# Patient Record
Sex: MALE | Race: WHITE | NOT HISPANIC OR LATINO
[De-identification: names, ages, dates, MRNs, and addresses within clinical notes are randomized per-mention and may not be internally consistent; named-entity substitution may affect disease eponyms.]

---

## 2020-07-17 PROBLEM — Z00.00 ENCOUNTER FOR PREVENTIVE HEALTH EXAMINATION: Status: ACTIVE | Noted: 2020-07-17

## 2020-07-21 ENCOUNTER — APPOINTMENT (OUTPATIENT)
Dept: SURGERY | Facility: CLINIC | Age: 80
End: 2020-07-21
Payer: MEDICARE

## 2020-07-21 VITALS
WEIGHT: 187 LBS | DIASTOLIC BLOOD PRESSURE: 84 MMHG | HEART RATE: 97 BPM | BODY MASS INDEX: 26.18 KG/M2 | SYSTOLIC BLOOD PRESSURE: 165 MMHG | HEIGHT: 71 IN | TEMPERATURE: 98.4 F

## 2020-07-21 DIAGNOSIS — Z87.09 PERSONAL HISTORY OF OTHER DISEASES OF THE RESPIRATORY SYSTEM: ICD-10-CM

## 2020-07-21 DIAGNOSIS — Z86.79 PERSONAL HISTORY OF OTHER DISEASES OF THE CIRCULATORY SYSTEM: ICD-10-CM

## 2020-07-21 PROCEDURE — 99204 OFFICE O/P NEW MOD 45 MIN: CPT

## 2020-07-21 RX ORDER — FLUTICASONE PROPION/SALMETEROL 500-50 MCG
BLISTER, WITH INHALATION DEVICE INHALATION
Refills: 0 | Status: ACTIVE | COMMUNITY

## 2020-07-21 RX ORDER — AMLODIPINE BESYLATE 10 MG/1
10 TABLET ORAL
Refills: 0 | Status: ACTIVE | COMMUNITY

## 2020-07-21 NOTE — CONSULT LETTER
[Dear  ___] : Dear  [unfilled], [Consult Letter:] : I had the pleasure of evaluating your patient, [unfilled]. [Please see my note below.] : Please see my note below. [FreeTextEntry1] : Hi Don- large hernias. I'll attempt to repair once cardiology has seen him. Thanks-Ugo

## 2020-07-21 NOTE — PLAN
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  headache, lower back pain, photophobia, phonophobia.  T max 102.6 s/p solumedrol x 1 dose and Ceftriaxone 2g x 1 dose   Lumbar puncture was done showed elevated protein and many cells with rbcs PCR positive for herpes.   HIV negative.     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  headache, lower back pain, photophobia, phonophobia.  T max 102.6 s/p solumedrol x 1 dose and Ceftriaxone 2g x 1 dose   Lumbar puncture was done showed elevated protein and many cells with rbcs PCR positive for herpes.   HIV negative.   back pain improved    Herpes meningitis  continue Acyclovir 10 mg/ kg/q 8 hours [FreeTextEntry1] : He will undergo a cardiac workup for low blood pressure first. I will order a CT scan to assess the content of the large hernia sacs. Will schedule for robotically assisted laparoscopic bilateral inguina, hernia repairs with mesh, umbilical hernia repair and possible open repairs once medically cleared.

## 2020-07-21 NOTE — ASSESSMENT
[FreeTextEntry1] : bilateral inguinal hernias with large amount of contents within the scrotum. \par surgical repair recommended. discussed a robotic approach and the The risks benefits and alternatives were discussed and the patient agrees to the described plan. I di discuss likelihood of postop hydrocele causing persistence of scrotal swelling and its ramifications. His expectations of the repair have been managed. \par

## 2020-07-21 NOTE — HISTORY OF PRESENT ILLNESS
[de-identified] : The patient presents with longstanding large bilateral scrotal hernias that he has had for over 15 yrs. He has not had them repaired in the past due to fear of surgery and minimal symptoms. However he is starting to feel they are heavier and causing more discomfort. He is active, owns a fish store. He is undergoing a cardiology workup for labile blood pressure currently. He has had no previous surgery in the past. He denies GI symptoms. He recently saw Dr Bromberg for urinary retention but is now on Flomax with relief.

## 2020-07-21 NOTE — PHYSICAL EXAM
[Normal Breath Sounds] : Normal breath sounds [Normal Heart Sounds] : normal heart sounds [Abdominal Masses] : No abdominal masses [Abdomen Tenderness] : ~T ~M No abdominal tenderness [No Rash or Lesion] : No rash or lesion [Alert] : alert [Oriented to Person] : oriented to person [Oriented to Place] : oriented to place [Oriented to Time] : oriented to time [Calm] : calm [de-identified] : NAD [de-identified] : large bilateral scrotal enlarged inguinal. hernias, small umbilical hernia,

## 2020-08-04 ENCOUNTER — APPOINTMENT (OUTPATIENT)
Dept: THORACIC SURGERY | Facility: HOSPITAL | Age: 80
End: 2020-08-04

## 2020-10-27 ENCOUNTER — APPOINTMENT (OUTPATIENT)
Dept: SURGERY | Facility: CLINIC | Age: 80
End: 2020-10-27
Payer: MEDICARE

## 2020-10-27 VITALS
BODY MASS INDEX: 26.88 KG/M2 | TEMPERATURE: 98.2 F | HEIGHT: 71 IN | SYSTOLIC BLOOD PRESSURE: 131 MMHG | DIASTOLIC BLOOD PRESSURE: 72 MMHG | HEART RATE: 86 BPM | WEIGHT: 192 LBS

## 2020-10-27 PROCEDURE — 99213 OFFICE O/P EST LOW 20 MIN: CPT

## 2020-10-27 PROCEDURE — 99072 ADDL SUPL MATRL&STAF TM PHE: CPT

## 2020-10-27 NOTE — ASSESSMENT
[FreeTextEntry1] : discussed plan- robotic assisted laparoscopic bilateral inguinal hernia repairs with mesh, open umbilical hernia repair\par The risks benefits and alternatives were discussed and the patient agrees to the described plan.\par

## 2020-10-27 NOTE — PHYSICAL EXAM
[Normal Breath Sounds] : Normal breath sounds [Normal Heart Sounds] : normal heart sounds [Alert] : alert [Oriented to Person] : oriented to person [Oriented to Place] : oriented to place [Oriented to Time] : oriented to time [Calm] : calm [de-identified] : NAD [de-identified] : large scrotal hernias bilaterally, stable, umbilical hernia

## 2020-10-27 NOTE — HISTORY OF PRESENT ILLNESS
[de-identified] : The patient presents for further discussion regarding large scrotal bilateral inguinal hernias. He had CT which verified small bowel contents within the hernia sacs. He had a recent PM placed. \par

## 2020-10-27 NOTE — PLAN
[FreeTextEntry1] : He will see Dr Marroquin (cardiology) for clearance and undergo PST/Covid testing at [facility]. He is looking towards Monday November November 30th.

## 2021-02-16 ENCOUNTER — APPOINTMENT (OUTPATIENT)
Dept: SURGERY | Facility: CLINIC | Age: 81
End: 2021-02-16
Payer: MEDICARE

## 2021-02-16 VITALS
WEIGHT: 197 LBS | TEMPERATURE: 97.2 F | HEART RATE: 88 BPM | HEIGHT: 71 IN | BODY MASS INDEX: 27.58 KG/M2 | DIASTOLIC BLOOD PRESSURE: 74 MMHG | SYSTOLIC BLOOD PRESSURE: 157 MMHG

## 2021-02-16 DIAGNOSIS — K40.90 UNILATERAL INGUINAL HERNIA, W/OUT OBSTRUCTION OR GANGRENE, NOT SPECIFIED AS RECURRENT: ICD-10-CM

## 2021-02-16 DIAGNOSIS — K42.9 UMBILICAL HERNIA W/OUT OBSTRUCTION OR GANGRENE: ICD-10-CM

## 2021-02-16 PROCEDURE — 99072 ADDL SUPL MATRL&STAF TM PHE: CPT

## 2021-02-16 PROCEDURE — 99213 OFFICE O/P EST LOW 20 MIN: CPT

## 2021-02-16 NOTE — PHYSICAL EXAM
[Abdominal Masses] : No abdominal masses [de-identified] : NAD [de-identified] : large scrotal hernia, (L>R), bilateral inguinal hernias

## 2021-02-16 NOTE — ASSESSMENT
[FreeTextEntry1] : robotic repair of bilateral inguinal hernias still recommended but only after completion of cardiac workup. Discussed with pt. He will agree to the stress test .

## 2021-02-16 NOTE — HISTORY OF PRESENT ILLNESS
[de-identified] : the patient returns to further discuss options of surgery. His symptoms are slightly worse- more discomfort. He has seen Dr Marroquin for cardiac workup and a stress test has been recommended. He has been rel;uctant to have the test and  and comes in today to discuss further. He was tentatively schedule for 2/22/21 but this may need to be postponed if he does not complete the cardiac workup.  [de-identified] : Previous A/P from 7/21/2020\par Assessment\par bilateral inguinal hernias with large amount of contents within the scrotum. \par surgical repair recommended. discussed a robotic approach and the The risks benefits and alternatives were discussed and the patient agrees to the described plan. I did discuss likelihood of postop hydrocele causing persistence of scrotal swelling and its ramifications. His expectations of the repair have been managed. \par  \par Plan\par He will undergo a cardiac workup for low blood pressure first. I will order a CT scan to assess the content of the large hernia sacs. Will schedule for robotically assisted laparoscopic bilateral inguina, hernia repairs with mesh, umbilical hernia repair and possible open repairs once medically cleared. \par \par

## 2021-02-22 ENCOUNTER — APPOINTMENT (OUTPATIENT)
Dept: SURGERY | Facility: HOSPITAL | Age: 81
End: 2021-02-22

## 2022-03-31 DIAGNOSIS — Z78.9 OTHER SPECIFIED HEALTH STATUS: ICD-10-CM

## 2022-03-31 DIAGNOSIS — M77.41 METATARSALGIA, RIGHT FOOT: ICD-10-CM

## 2022-03-31 DIAGNOSIS — Z86.79 PERSONAL HISTORY OF OTHER DISEASES OF THE CIRCULATORY SYSTEM: ICD-10-CM

## 2022-03-31 DIAGNOSIS — Z87.891 PERSONAL HISTORY OF NICOTINE DEPENDENCE: ICD-10-CM

## 2022-03-31 DIAGNOSIS — Z14.8 GENETIC CARRIER OF OTHER DISEASE: ICD-10-CM

## 2022-03-31 RX ORDER — ALBUTEROL SULFATE 90 UG/1
108 (90 BASE) AEROSOL, METERED RESPIRATORY (INHALATION)
Refills: 0 | Status: ACTIVE | COMMUNITY

## 2022-03-31 RX ORDER — LOSARTAN POTASSIUM 100 MG/1
100 TABLET, FILM COATED ORAL
Refills: 0 | Status: ACTIVE | COMMUNITY

## 2022-04-06 ENCOUNTER — NON-APPOINTMENT (OUTPATIENT)
Age: 82
End: 2022-04-06

## 2022-04-07 ENCOUNTER — APPOINTMENT (OUTPATIENT)
Dept: HEMATOLOGY ONCOLOGY | Facility: CLINIC | Age: 82
End: 2022-04-07

## 2022-12-26 ENCOUNTER — TRANSCRIPTION ENCOUNTER (OUTPATIENT)
Age: 82
End: 2022-12-26

## 2023-04-11 ENCOUNTER — APPOINTMENT (OUTPATIENT)
Dept: INTERNAL MEDICINE | Facility: CLINIC | Age: 83
End: 2023-04-11
Payer: MEDICARE

## 2023-04-11 PROCEDURE — 36415 COLL VENOUS BLD VENIPUNCTURE: CPT

## 2023-04-11 PROCEDURE — 99214 OFFICE O/P EST MOD 30 MIN: CPT | Mod: 25

## 2023-12-10 ENCOUNTER — RESULT REVIEW (OUTPATIENT)
Age: 83
End: 2023-12-10

## 2023-12-13 ENCOUNTER — RESULT REVIEW (OUTPATIENT)
Age: 83
End: 2023-12-13

## 2023-12-19 ENCOUNTER — TRANSCRIPTION ENCOUNTER (OUTPATIENT)
Age: 83
End: 2023-12-19

## 2024-01-23 ENCOUNTER — APPOINTMENT (OUTPATIENT)
Dept: PULMONOLOGY | Facility: CLINIC | Age: 84
End: 2024-01-23

## 2024-02-13 ENCOUNTER — APPOINTMENT (OUTPATIENT)
Dept: PULMONOLOGY | Facility: CLINIC | Age: 84
End: 2024-02-13

## 2024-02-26 ENCOUNTER — RESULT REVIEW (OUTPATIENT)
Age: 84
End: 2024-02-26

## 2024-02-27 ENCOUNTER — APPOINTMENT (OUTPATIENT)
Dept: PULMONOLOGY | Facility: CLINIC | Age: 84
End: 2024-02-27
Payer: MEDICARE

## 2024-02-27 VITALS
SYSTOLIC BLOOD PRESSURE: 159 MMHG | DIASTOLIC BLOOD PRESSURE: 76 MMHG | WEIGHT: 170 LBS | HEART RATE: 71 BPM | TEMPERATURE: 97.9 F | RESPIRATION RATE: 16 BRPM | OXYGEN SATURATION: 98 % | BODY MASS INDEX: 23.8 KG/M2 | HEIGHT: 71 IN

## 2024-02-27 PROCEDURE — 99214 OFFICE O/P EST MOD 30 MIN: CPT

## 2024-02-27 NOTE — ASSESSMENT
[FreeTextEntry1] : Bacterial pneumonia  I reviewed the chest x-ray.  There is resolution of the right upper lobe infiltrate.  I reviewed the images and compared to the previous 1.  The patient is not on antibiotic and he recovered from the pneumonia.  COPD  His both emphysema and chronic bronchitis.  I will follow-up on the CT scan of the chest to rule out underlying bronchiectasis as contributing to the chronic cough.  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  The 6-minute walk test was normal.  The patient is currently on Advair.  I informed the patient and the wife that Advair is not the first-line recommendation for COPD by the new guidelines but also the recommended not to change the medication if the patient is stable.  Patient is to DC the infant nebulized inhaled steroids as already on inhaled steroids and to continue on albuterol as needed basis.  Will follow-up on yearly PFT.  Patient is to continue increase activity as tolerated.  Patient has good quality of life.  Snoring

## 2024-02-27 NOTE — REVIEW OF SYSTEMS
[Cough] : cough [Hemoptysis] : no hemoptysis [Chest Tightness] : no chest tightness [Frequent URIs] : no frequent URIs [Sputum] : sputum [Dyspnea] : no dyspnea [Pleuritic Pain] : no pleuritic pain [Wheezing] : no wheezing [A.M. Dry Mouth] : no a.m. dry mouth [SOB on Exertion] : no sob on exertion [Negative] : Endocrine

## 2024-02-27 NOTE — HISTORY OF PRESENT ILLNESS
[Former] : former [Never] : never [Awakes with Dry Mouth] : awakes with dry mouth [Snoring] : snoring [TextBox_4] : Is doing better since the left the rehab.  He is walking around the house.  He is able to dress himself and shower without any assistance.  He go grocery shopping and he can carry objects and walk to the car with no exertion.  Only time is short of breath if he go up the stairs carrying groceries.  He has still have this productive cough the phlegm is between white and yellow.  This started him on budenoscide in the rehab. [Awakes Unrefreshed] : does not awaken unrefreshed [Awakes with Headache] : does not awaken with headache [Daytime Somnolence] : denies daytime somnolence [Difficulty Initiating Sleep] : does not have difficulty initiating sleep [Difficulty Maintaining Sleep] : does not have difficulty maintaining sleep [Fatigue] : no fatigue [Frequent Nocturnal Awakening] : denies frequent nocturnal awakening [Tired while Driving] : not tired while driving [Unintentional Sleep while Active] : no unintentional sleep while active [Unintentional Sleep while Inactive] : no unintentional sleep while inactive [Vivid dreams] : no vivid dreams [Witnessed Apneas] : no witnessed apneas [ESS] : 0

## 2024-03-06 ENCOUNTER — RESULT REVIEW (OUTPATIENT)
Age: 84
End: 2024-03-06

## 2024-03-18 ENCOUNTER — RESULT REVIEW (OUTPATIENT)
Age: 84
End: 2024-03-18

## 2024-03-19 ENCOUNTER — APPOINTMENT (OUTPATIENT)
Dept: PULMONOLOGY | Facility: CLINIC | Age: 84
End: 2024-03-19
Payer: MEDICARE

## 2024-03-19 VITALS
HEIGHT: 71 IN | BODY MASS INDEX: 23.52 KG/M2 | WEIGHT: 168 LBS | SYSTOLIC BLOOD PRESSURE: 153 MMHG | RESPIRATION RATE: 16 BRPM | OXYGEN SATURATION: 98 % | TEMPERATURE: 97.3 F | HEART RATE: 69 BPM | DIASTOLIC BLOOD PRESSURE: 78 MMHG

## 2024-03-19 DIAGNOSIS — J43.2 CENTRILOBULAR EMPHYSEMA: ICD-10-CM

## 2024-03-19 PROCEDURE — 99214 OFFICE O/P EST MOD 30 MIN: CPT

## 2024-03-19 NOTE — END OF VISIT
[FreeTextEntry3] : Pt seen with KARINA Ryder and agree on the above plan of care.  [>50% of the face to face encounter time was spent on counseling and/or coordination of care for ___] : Greater than 50% of the face to face encounter time was spent on counseling and/or coordination of care for [unfilled] [Time Spent: ___ minutes] : I have spent [unfilled] minutes of time on the encounter.

## 2024-03-19 NOTE — ASSESSMENT
[FreeTextEntry1] : Bacterial pneumonia  I reviewed the images of the CT scan with the pt and wife.  CT scan Patchy airspace opacities in the posterior right lower lobe compatible with either atelectasis or pneumonia. Recommend follow-up chest CT in 3 months time to assess for resolution.  No discrete pulmonary nodule or mass. No bronchiectasis.  There is resolution of the right upper lobe infiltrate.  I reviewed the images and compared to the previous 1.  The patient is not on antibiotic and he recovered from the pneumonia.  Discussed cannot R/O cancer recommend follow with repeat CT scan.   Follow up with CT scan in 3 months.   COPD  His both emphysema and chronic bronchitis.  I will follow-up on the CT scan of the chest to rule out underlying bronchiectasis as contributing to the chronic cough.  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  The 6-minute walk test was normal.  The patient is currently on Advair.  I informed the patient and the wife that Advair is not the first-line recommendation for COPD by the new guidelines but also the recommended not to change the medication if the patient is stable.  Will follow-up on yearly PFT.  Patient is to continue increase activity as tolerated.  Patient has good quality of life.  Snoring  I discussed the short and long term health effect of the obstructive seep apnea with the patient. These effects include, but not limited to, uncontrolled hypertension, CAD, arrhythmias, sudden death, CVA, and pulmonary hypertension. I advised the patient to avoid sedatives, narcotics, driving, and sleeping pills in the meantime. I discussed the therapeutic options including but not limited to CPAP, surgery, and oral appliance. Further recommendations will follow after sleep study.

## 2024-03-19 NOTE — REVIEW OF SYSTEMS
[Cough] : cough [Sputum] : sputum [Negative] : Psychiatric [Hemoptysis] : no hemoptysis [Chest Tightness] : no chest tightness [Frequent URIs] : no frequent URIs [Dyspnea] : no dyspnea [Pleuritic Pain] : no pleuritic pain [Wheezing] : no wheezing [SOB on Exertion] : no sob on exertion [A.M. Dry Mouth] : no a.m. dry mouth

## 2024-03-19 NOTE — HISTORY OF PRESENT ILLNESS
[Former] : former [Never] : never [Awakes with Dry Mouth] : awakes with dry mouth [Snoring] : snoring [TextBox_4] :  83 yr old male with PMH COPD, PNA.  Present for follow up.    He is compliant with Advair BID and rarely using ProAir.  Denies fevers.  Occasional cough with sputum nude color without any hemoptysis.  He has a good appetite and denies any weight loss.   He is doing daily activities. He is able to walk up a flight of stairs without SOB.  He is following cardiology and went for a stress test yesterday.    CT scan  MPRESSION: Patchy airspace opacities in the posterior right lower lobe compatible with either atelectasis or pneumonia. Recommend follow-up chest CT in 3 months time to assess for resolution.  No discrete pulmonary nodule or mass. No bronchiectasis.   [Awakes Unrefreshed] : does not awaken unrefreshed [Awakes with Headache] : does not awaken with headache [Daytime Somnolence] : denies daytime somnolence [Difficulty Initiating Sleep] : does not have difficulty initiating sleep [Difficulty Maintaining Sleep] : does not have difficulty maintaining sleep [Fatigue] : no fatigue [Frequent Nocturnal Awakening] : denies frequent nocturnal awakening [Tired while Driving] : not tired while driving [Unintentional Sleep while Active] : no unintentional sleep while active [Unintentional Sleep while Inactive] : no unintentional sleep while inactive [Vivid dreams] : no vivid dreams [Witnessed Apneas] : no witnessed apneas [ESS] : 0

## 2024-03-19 NOTE — PHYSICAL EXAM
[No Acute Distress] : no acute distress [Normal Oropharynx] : normal oropharynx [No Neck Mass] : no neck mass [Normal Appearance] : normal appearance [Normal Rate/Rhythm] : normal rate/rhythm [Normal S1, S2] : normal s1, s2 [No Murmurs] : no murmurs [No Resp Distress] : no resp distress [Clear to Auscultation Bilaterally] : clear to auscultation bilaterally [No Abnormalities] : no abnormalities [Benign] : benign [No Clubbing] : no clubbing [Normal Gait] : normal gait [No Cyanosis] : no cyanosis [No Edema] : no edema [FROM] : FROM [Normal Color/ Pigmentation] : normal color/ pigmentation [No Focal Deficits] : no focal deficits [Oriented x3] : oriented x3 [Normal Affect] : normal affect

## 2024-05-17 ENCOUNTER — APPOINTMENT (OUTPATIENT)
Dept: PULMONOLOGY | Facility: CLINIC | Age: 84
End: 2024-05-17

## 2024-06-02 ENCOUNTER — RESULT REVIEW (OUTPATIENT)
Age: 84
End: 2024-06-02

## 2024-06-10 ENCOUNTER — NON-APPOINTMENT (OUTPATIENT)
Age: 84
End: 2024-06-10

## 2024-07-02 ENCOUNTER — APPOINTMENT (OUTPATIENT)
Dept: PULMONOLOGY | Facility: CLINIC | Age: 84
End: 2024-07-02
Payer: MEDICARE

## 2024-07-02 VITALS
HEART RATE: 67 BPM | DIASTOLIC BLOOD PRESSURE: 74 MMHG | RESPIRATION RATE: 12 BRPM | HEIGHT: 71 IN | OXYGEN SATURATION: 97 % | SYSTOLIC BLOOD PRESSURE: 142 MMHG | TEMPERATURE: 98 F | BODY MASS INDEX: 24.64 KG/M2 | WEIGHT: 176 LBS

## 2024-07-02 DIAGNOSIS — R06.83 SNORING: ICD-10-CM

## 2024-07-02 DIAGNOSIS — J15.9 UNSPECIFIED BACTERIAL PNEUMONIA: ICD-10-CM

## 2024-07-02 DIAGNOSIS — J44.89 OTHER SPECIFIED CHRONIC OBSTRUCTIVE PULMONARY DISEASE: ICD-10-CM

## 2024-07-02 PROCEDURE — G2211 COMPLEX E/M VISIT ADD ON: CPT

## 2024-07-02 PROCEDURE — 99213 OFFICE O/P EST LOW 20 MIN: CPT

## 2024-08-24 ENCOUNTER — TRANSCRIPTION ENCOUNTER (OUTPATIENT)
Age: 84
End: 2024-08-24

## 2024-08-27 ENCOUNTER — APPOINTMENT (OUTPATIENT)
Dept: PULMONOLOGY | Facility: CLINIC | Age: 84
End: 2024-08-27

## 2024-11-21 ENCOUNTER — APPOINTMENT (OUTPATIENT)
Dept: PULMONOLOGY | Facility: CLINIC | Age: 84
End: 2024-11-21
Payer: MEDICARE

## 2024-11-21 VITALS
DIASTOLIC BLOOD PRESSURE: 83 MMHG | TEMPERATURE: 98.3 F | OXYGEN SATURATION: 96 % | RESPIRATION RATE: 12 BRPM | WEIGHT: 185 LBS | BODY MASS INDEX: 25.9 KG/M2 | SYSTOLIC BLOOD PRESSURE: 145 MMHG | HEART RATE: 83 BPM | HEIGHT: 71 IN

## 2024-11-21 DIAGNOSIS — J44.89 OTHER SPECIFIED CHRONIC OBSTRUCTIVE PULMONARY DISEASE: ICD-10-CM

## 2024-11-21 DIAGNOSIS — Z23 ENCOUNTER FOR IMMUNIZATION: ICD-10-CM

## 2024-11-21 DIAGNOSIS — R40.0 SOMNOLENCE: ICD-10-CM

## 2024-11-21 PROCEDURE — 99213 OFFICE O/P EST LOW 20 MIN: CPT

## 2024-11-21 PROCEDURE — G2211 COMPLEX E/M VISIT ADD ON: CPT

## 2024-11-25 PROBLEM — R40.0 DAYTIME SLEEPINESS: Status: ACTIVE | Noted: 2024-11-25

## 2025-01-14 ENCOUNTER — APPOINTMENT (OUTPATIENT)
Dept: PULMONOLOGY | Facility: CLINIC | Age: 85
End: 2025-01-14
Payer: MEDICARE

## 2025-01-14 VITALS
TEMPERATURE: 97.9 F | SYSTOLIC BLOOD PRESSURE: 173 MMHG | RESPIRATION RATE: 16 BRPM | HEIGHT: 71 IN | BODY MASS INDEX: 26.46 KG/M2 | HEART RATE: 83 BPM | WEIGHT: 189 LBS | DIASTOLIC BLOOD PRESSURE: 94 MMHG | OXYGEN SATURATION: 96 %

## 2025-01-14 DIAGNOSIS — J44.89 OTHER SPECIFIED CHRONIC OBSTRUCTIVE PULMONARY DISEASE: ICD-10-CM

## 2025-01-14 DIAGNOSIS — R05.9 COUGH, UNSPECIFIED: ICD-10-CM

## 2025-01-14 DIAGNOSIS — B37.0 CANDIDAL STOMATITIS: ICD-10-CM

## 2025-01-14 DIAGNOSIS — J15.9 UNSPECIFIED BACTERIAL PNEUMONIA: ICD-10-CM

## 2025-01-14 DIAGNOSIS — J43.2 CENTRILOBULAR EMPHYSEMA: ICD-10-CM

## 2025-01-14 PROCEDURE — 99214 OFFICE O/P EST MOD 30 MIN: CPT

## 2025-01-14 RX ORDER — AZITHROMYCIN 250 MG/1
250 TABLET, FILM COATED ORAL
Qty: 1 | Refills: 0 | Status: ACTIVE | COMMUNITY
Start: 2025-01-14 | End: 1900-01-01

## 2025-01-14 RX ORDER — NYSTATIN 100000 [USP'U]/ML
100000 SUSPENSION ORAL
Qty: 210 | Refills: 0 | Status: ACTIVE | COMMUNITY
Start: 2025-01-14 | End: 1900-01-01

## 2025-01-14 RX ORDER — FLUTICASONE PROPIONATE AND SALMETEROL 100; 50 UG/1; UG/1
100-50 POWDER RESPIRATORY (INHALATION)
Qty: 180 | Refills: 3 | Status: ACTIVE | COMMUNITY
Start: 2025-01-14 | End: 1900-01-01

## 2025-01-22 RX ORDER — AMOXICILLIN AND CLAVULANATE POTASSIUM 875; 125 MG/1; MG/1
875-125 TABLET, COATED ORAL
Qty: 14 | Refills: 0 | Status: ACTIVE | COMMUNITY
Start: 2025-01-22 | End: 1900-01-01

## 2025-01-28 ENCOUNTER — RESULT REVIEW (OUTPATIENT)
Age: 85
End: 2025-01-28

## 2025-02-05 ENCOUNTER — NON-APPOINTMENT (OUTPATIENT)
Age: 85
End: 2025-02-05

## 2025-02-25 ENCOUNTER — APPOINTMENT (OUTPATIENT)
Dept: PULMONOLOGY | Facility: CLINIC | Age: 85
End: 2025-02-25
Payer: MEDICARE

## 2025-02-25 VITALS
BODY MASS INDEX: 26.6 KG/M2 | OXYGEN SATURATION: 97 % | RESPIRATION RATE: 16 BRPM | DIASTOLIC BLOOD PRESSURE: 83 MMHG | WEIGHT: 190 LBS | TEMPERATURE: 97.9 F | HEART RATE: 76 BPM | HEIGHT: 71 IN | SYSTOLIC BLOOD PRESSURE: 144 MMHG

## 2025-02-25 DIAGNOSIS — J44.89 OTHER SPECIFIED CHRONIC OBSTRUCTIVE PULMONARY DISEASE: ICD-10-CM

## 2025-02-25 PROCEDURE — 99213 OFFICE O/P EST LOW 20 MIN: CPT

## 2025-02-25 PROCEDURE — G2211 COMPLEX E/M VISIT ADD ON: CPT

## 2025-02-25 RX ORDER — FLUTICASONE PROPIONATE AND SALMETEROL 250; 50 UG/1; UG/1
250-50 POWDER RESPIRATORY (INHALATION)
Qty: 60 | Refills: 11 | Status: ACTIVE | COMMUNITY
Start: 2025-02-25 | End: 1900-01-01

## 2025-03-25 ENCOUNTER — APPOINTMENT (OUTPATIENT)
Dept: PULMONOLOGY | Facility: CLINIC | Age: 85
End: 2025-03-25

## 2025-05-19 ENCOUNTER — NON-APPOINTMENT (OUTPATIENT)
Age: 85
End: 2025-05-19

## 2025-05-24 ENCOUNTER — TRANSCRIPTION ENCOUNTER (OUTPATIENT)
Age: 85
End: 2025-05-24

## 2025-07-16 ENCOUNTER — APPOINTMENT (OUTPATIENT)
Dept: HEART AND VASCULAR | Facility: CLINIC | Age: 85
End: 2025-07-16

## 2025-07-16 VITALS
TEMPERATURE: 98.2 F | OXYGEN SATURATION: 97 % | WEIGHT: 185 LBS | DIASTOLIC BLOOD PRESSURE: 60 MMHG | HEART RATE: 76 BPM | RESPIRATION RATE: 16 BRPM | BODY MASS INDEX: 25.9 KG/M2 | SYSTOLIC BLOOD PRESSURE: 106 MMHG | HEIGHT: 71 IN

## 2025-07-16 PROBLEM — I44.30 AV BLOCK: Status: ACTIVE | Noted: 2025-07-16

## 2025-07-16 PROBLEM — Z95.0 PACEMAKER: Status: ACTIVE | Noted: 2025-07-16
